# Patient Record
Sex: FEMALE | Race: OTHER | Employment: STUDENT | URBAN - METROPOLITAN AREA
[De-identification: names, ages, dates, MRNs, and addresses within clinical notes are randomized per-mention and may not be internally consistent; named-entity substitution may affect disease eponyms.]

---
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
## 2023-03-27 ENCOUNTER — HOSPITAL ENCOUNTER (OUTPATIENT)
Age: 18
Discharge: HOME | End: 2023-03-27
Payer: MEDICAID

## 2023-03-27 VITALS
HEIGHT: 67 IN | WEIGHT: 200 LBS | DIASTOLIC BLOOD PRESSURE: 48 MMHG | HEART RATE: 100 BPM | RESPIRATION RATE: 18 BRPM | BODY MASS INDEX: 31.39 KG/M2 | SYSTOLIC BLOOD PRESSURE: 126 MMHG | OXYGEN SATURATION: 100 % | TEMPERATURE: 98 F

## 2023-03-27 DIAGNOSIS — N89.8 VAGINAL IRRITATION: Primary | ICD-10-CM

## 2023-03-27 LAB
B-HCG UR QL: NEGATIVE
POCT BILIRUBIN URINE: NEGATIVE
POCT BLOOD URINE: NEGATIVE
POCT GLUCOSE URINE: NEGATIVE MG/DL
POCT KETONE URINE: NEGATIVE MG/DL
POCT LEUKOCYTE ESTERASE URINE: NEGATIVE
POCT NITRITE URINE: NEGATIVE
POCT PH URINE: 6 (ref 5–8)
POCT PROTEIN URINE: NEGATIVE MG/DL
POCT SPECIFIC GRAVITY URINE: 1.01
POCT UROBILINOGEN URINE: 0.2 MG/DL

## 2023-03-27 PROCEDURE — 87491 CHLMYD TRACH DNA AMP PROBE: CPT | Performed by: NURSE PRACTITIONER

## 2023-03-27 PROCEDURE — 81025 URINE PREGNANCY TEST: CPT

## 2023-03-27 PROCEDURE — 87591 N.GONORRHOEAE DNA AMP PROB: CPT | Performed by: NURSE PRACTITIONER

## 2023-03-27 PROCEDURE — 81002 URINALYSIS NONAUTO W/O SCOPE: CPT | Performed by: NURSE PRACTITIONER

## 2023-03-27 PROCEDURE — 87510 GARDNER VAG DNA DIR PROBE: CPT | Performed by: NURSE PRACTITIONER

## 2023-03-27 PROCEDURE — 87480 CANDIDA DNA DIR PROBE: CPT | Performed by: NURSE PRACTITIONER

## 2023-03-27 PROCEDURE — 99214 OFFICE O/P EST MOD 30 MIN: CPT

## 2023-03-27 PROCEDURE — 87660 TRICHOMONAS VAGIN DIR PROBE: CPT | Performed by: NURSE PRACTITIONER

## 2023-03-27 NOTE — ED INITIAL ASSESSMENT (HPI)
Patient states vaginal itching x 5-6 days with some intermittent low abdominal discomfort. Denies any fevers, chills, N/V/D, dysuria, hematuria, or other symptoms.

## 2023-03-28 LAB
C TRACH DNA SPEC QL NAA+PROBE: NEGATIVE
N GONORRHOEA DNA SPEC QL NAA+PROBE: NEGATIVE

## 2023-03-28 RX ORDER — FLUCONAZOLE 150 MG/1
150 TABLET ORAL ONCE
Qty: 2 TABLET | Refills: 0 | Status: SHIPPED | OUTPATIENT
Start: 2023-03-28 | End: 2023-03-28

## 2023-12-21 ENCOUNTER — HOSPITAL ENCOUNTER (EMERGENCY)
Age: 18
Discharge: HOME | End: 2023-12-21
Attending: STUDENT IN AN ORGANIZED HEALTH CARE EDUCATION/TRAINING PROGRAM
Payer: MEDICAID

## 2023-12-21 ENCOUNTER — APPOINTMENT (OUTPATIENT)
Dept: ULTRASOUND IMAGING | Age: 18
End: 2023-12-21
Attending: STUDENT IN AN ORGANIZED HEALTH CARE EDUCATION/TRAINING PROGRAM
Payer: MEDICAID

## 2023-12-21 VITALS
RESPIRATION RATE: 18 BRPM | SYSTOLIC BLOOD PRESSURE: 133 MMHG | WEIGHT: 196 LBS | HEART RATE: 89 BPM | HEIGHT: 67 IN | OXYGEN SATURATION: 100 % | BODY MASS INDEX: 30.76 KG/M2 | TEMPERATURE: 100 F | DIASTOLIC BLOOD PRESSURE: 53 MMHG

## 2023-12-21 DIAGNOSIS — R10.9 ACUTE RIGHT FLANK PAIN: ICD-10-CM

## 2023-12-21 DIAGNOSIS — O21.9 NAUSEA AND VOMITING IN PREGNANCY: ICD-10-CM

## 2023-12-21 DIAGNOSIS — R10.31 RLQ ABDOMINAL PAIN: Primary | ICD-10-CM

## 2023-12-21 LAB
ALBUMIN SERPL-MCNC: 3.2 G/DL (ref 3.4–5)
ALBUMIN/GLOB SERPL: 0.7 {RATIO} (ref 1–2)
ALP LIVER SERPL-CCNC: 74 U/L
ALT SERPL-CCNC: 19 U/L
ANION GAP SERPL CALC-SCNC: 4 MMOL/L (ref 0–18)
AST SERPL-CCNC: 11 U/L (ref 15–37)
B-HCG SERPL-ACNC: 94 MIU/ML
BASOPHILS # BLD AUTO: 0.05 X10(3) UL (ref 0–0.2)
BASOPHILS NFR BLD AUTO: 0.5 %
BILIRUB SERPL-MCNC: 0.2 MG/DL (ref 0.1–2)
BILIRUB UR QL STRIP.AUTO: NEGATIVE
BUN BLD-MCNC: 15 MG/DL (ref 9–23)
CALCIUM BLD-MCNC: 8.6 MG/DL (ref 8.5–10.1)
CHLORIDE SERPL-SCNC: 109 MMOL/L (ref 98–112)
CLARITY UR REFRACT.AUTO: CLEAR
CO2 SERPL-SCNC: 25 MMOL/L (ref 21–32)
COLOR UR AUTO: YELLOW
CREAT BLD-MCNC: 0.66 MG/DL
EGFRCR SERPLBLD CKD-EPI 2021: 129 ML/MIN/1.73M2 (ref 60–?)
EOSINOPHIL # BLD AUTO: 0.17 X10(3) UL (ref 0–0.7)
EOSINOPHIL NFR BLD AUTO: 1.6 %
ERYTHROCYTE [DISTWIDTH] IN BLOOD BY AUTOMATED COUNT: 12.5 %
GLOBULIN PLAS-MCNC: 4.4 G/DL (ref 2.8–4.4)
GLUCOSE BLD-MCNC: 78 MG/DL (ref 70–99)
GLUCOSE UR STRIP.AUTO-MCNC: NEGATIVE MG/DL
HCT VFR BLD AUTO: 37.2 %
HGB BLD-MCNC: 12.3 G/DL
IMM GRANULOCYTES # BLD AUTO: 0.03 X10(3) UL (ref 0–1)
IMM GRANULOCYTES NFR BLD: 0.3 %
KETONES UR STRIP.AUTO-MCNC: NEGATIVE MG/DL
LEUKOCYTE ESTERASE UR QL STRIP.AUTO: NEGATIVE
LIPASE SERPL-CCNC: 46 U/L (ref 13–75)
LYMPHOCYTES # BLD AUTO: 2.52 X10(3) UL (ref 1–4)
LYMPHOCYTES NFR BLD AUTO: 24.2 %
MCH RBC QN AUTO: 29.4 PG (ref 26–34)
MCHC RBC AUTO-ENTMCNC: 33.1 G/DL (ref 31–37)
MCV RBC AUTO: 89 FL
MONOCYTES # BLD AUTO: 0.66 X10(3) UL (ref 0.1–1)
MONOCYTES NFR BLD AUTO: 6.3 %
NEUTROPHILS # BLD AUTO: 6.98 X10 (3) UL (ref 1.5–7.7)
NEUTROPHILS # BLD AUTO: 6.98 X10(3) UL (ref 1.5–7.7)
NEUTROPHILS NFR BLD AUTO: 67.1 %
NITRITE UR QL STRIP.AUTO: NEGATIVE
OSMOLALITY SERPL CALC.SUM OF ELEC: 286 MOSM/KG (ref 275–295)
PH UR STRIP.AUTO: 6 [PH] (ref 5–8)
PLATELET # BLD AUTO: 316 10(3)UL (ref 150–450)
POTASSIUM SERPL-SCNC: 3.8 MMOL/L (ref 3.5–5.1)
PROT SERPL-MCNC: 7.6 G/DL (ref 6.4–8.2)
PROT UR STRIP.AUTO-MCNC: NEGATIVE MG/DL
RBC # BLD AUTO: 4.18 X10(6)UL
RBC UR QL AUTO: NEGATIVE
RH BLOOD TYPE: POSITIVE
SODIUM SERPL-SCNC: 138 MMOL/L (ref 136–145)
SP GR UR STRIP.AUTO: 1.02 (ref 1–1.03)
UROBILINOGEN UR STRIP.AUTO-MCNC: 0.2 MG/DL
WBC # BLD AUTO: 10.4 X10(3) UL (ref 4–11)

## 2023-12-21 PROCEDURE — 80053 COMPREHEN METABOLIC PANEL: CPT | Performed by: STUDENT IN AN ORGANIZED HEALTH CARE EDUCATION/TRAINING PROGRAM

## 2023-12-21 PROCEDURE — 84702 CHORIONIC GONADOTROPIN TEST: CPT | Performed by: STUDENT IN AN ORGANIZED HEALTH CARE EDUCATION/TRAINING PROGRAM

## 2023-12-21 PROCEDURE — 76817 TRANSVAGINAL US OBSTETRIC: CPT | Performed by: STUDENT IN AN ORGANIZED HEALTH CARE EDUCATION/TRAINING PROGRAM

## 2023-12-21 PROCEDURE — 99285 EMERGENCY DEPT VISIT HI MDM: CPT

## 2023-12-21 PROCEDURE — 85025 COMPLETE CBC W/AUTO DIFF WBC: CPT | Performed by: STUDENT IN AN ORGANIZED HEALTH CARE EDUCATION/TRAINING PROGRAM

## 2023-12-21 PROCEDURE — 96360 HYDRATION IV INFUSION INIT: CPT

## 2023-12-21 PROCEDURE — 86900 BLOOD TYPING SEROLOGIC ABO: CPT | Performed by: STUDENT IN AN ORGANIZED HEALTH CARE EDUCATION/TRAINING PROGRAM

## 2023-12-21 PROCEDURE — 76801 OB US < 14 WKS SINGLE FETUS: CPT | Performed by: STUDENT IN AN ORGANIZED HEALTH CARE EDUCATION/TRAINING PROGRAM

## 2023-12-21 PROCEDURE — 86901 BLOOD TYPING SEROLOGIC RH(D): CPT | Performed by: STUDENT IN AN ORGANIZED HEALTH CARE EDUCATION/TRAINING PROGRAM

## 2023-12-21 PROCEDURE — 81003 URINALYSIS AUTO W/O SCOPE: CPT | Performed by: STUDENT IN AN ORGANIZED HEALTH CARE EDUCATION/TRAINING PROGRAM

## 2023-12-21 PROCEDURE — 83690 ASSAY OF LIPASE: CPT | Performed by: STUDENT IN AN ORGANIZED HEALTH CARE EDUCATION/TRAINING PROGRAM

## 2023-12-21 PROCEDURE — 99284 EMERGENCY DEPT VISIT MOD MDM: CPT

## 2023-12-21 NOTE — ED INITIAL ASSESSMENT (HPI)
Right sided abd pain for one week. No nausea no fever no diarrhea pain does not worsen with movement. Took home pregnancy test which was positive.  Lmp 11/29/23

## 2023-12-22 NOTE — ED QUICK NOTES
Pt did not want to stay for results. Informed the pt of the benefits of staying and the risks of leaving. Pt stated she understood the teaching. IV taken out prior to leaving. Pt left the unit at this time. LEORAD made aware of pt leaving unit.

## 2023-12-22 NOTE — DISCHARGE INSTRUCTIONS
Initiate prenatal vitamins. You may take Tylenol as needed for pain. If severe uncontrolled pain or feeling faint were to develop seek care in the nearest emergency department or return here for care. It is my recommendation you follow up with an obstetrician within 1 week. Referral for the on-call OB provided in my chart. For further evaluation of your pregnancy with abdominal discomfort. You should seek emergency medical care if pain were to worsen or new concerns were to develop.

## 2023-12-29 ENCOUNTER — HOSPITAL ENCOUNTER (OUTPATIENT)
Age: 18
Discharge: HOME | End: 2023-12-29
Attending: EMERGENCY MEDICINE
Payer: MEDICAID

## 2023-12-29 VITALS
HEART RATE: 132 BPM | TEMPERATURE: 103 F | DIASTOLIC BLOOD PRESSURE: 41 MMHG | SYSTOLIC BLOOD PRESSURE: 116 MMHG | BODY MASS INDEX: 30.76 KG/M2 | HEIGHT: 67 IN | RESPIRATION RATE: 18 BRPM | OXYGEN SATURATION: 99 % | WEIGHT: 196 LBS

## 2023-12-29 DIAGNOSIS — J11.1 INFLUENZA: ICD-10-CM

## 2023-12-29 DIAGNOSIS — J02.0 STREPTOCOCCAL SORE THROAT: Primary | ICD-10-CM

## 2023-12-29 DIAGNOSIS — Z34.90 PREGNANCY, UNSPECIFIED GESTATIONAL AGE: ICD-10-CM

## 2023-12-29 LAB
POCT INFLUENZA A: POSITIVE
POCT INFLUENZA B: NEGATIVE
S PYO AG THROAT QL IA.RAPID: POSITIVE
SARS-COV-2 RNA RESP QL NAA+PROBE: NOT DETECTED

## 2023-12-29 PROCEDURE — 87651 STREP A DNA AMP PROBE: CPT | Performed by: EMERGENCY MEDICINE

## 2023-12-29 PROCEDURE — 99213 OFFICE O/P EST LOW 20 MIN: CPT

## 2023-12-29 PROCEDURE — 87502 INFLUENZA DNA AMP PROBE: CPT | Performed by: EMERGENCY MEDICINE

## 2023-12-29 PROCEDURE — 99214 OFFICE O/P EST MOD 30 MIN: CPT

## 2023-12-29 RX ORDER — ACETAMINOPHEN 500 MG
1000 TABLET ORAL ONCE
Status: COMPLETED | OUTPATIENT
Start: 2023-12-29 | End: 2023-12-29

## 2023-12-29 RX ORDER — AMOXICILLIN 875 MG/1
875 TABLET, COATED ORAL 2 TIMES DAILY
Qty: 20 TABLET | Refills: 0 | Status: SHIPPED | OUTPATIENT
Start: 2023-12-29 | End: 2024-01-08

## 2023-12-29 NOTE — ED INITIAL ASSESSMENT (HPI)
Body aches, cough, headache, fever x 2-3 days    Pt found out she's pregnant 2 weeks ago and was having abd cramping yesterday, no vag bleeding

## 2023-12-30 ENCOUNTER — HOSPITAL ENCOUNTER (EMERGENCY)
Age: 18
Discharge: HOME | End: 2023-12-30
Attending: EMERGENCY MEDICINE
Payer: MEDICAID

## 2023-12-30 ENCOUNTER — APPOINTMENT (OUTPATIENT)
Dept: ULTRASOUND IMAGING | Age: 18
End: 2023-12-30
Payer: MEDICAID

## 2023-12-30 VITALS
TEMPERATURE: 99 F | RESPIRATION RATE: 18 BRPM | WEIGHT: 196 LBS | HEART RATE: 107 BPM | BODY MASS INDEX: 31.5 KG/M2 | SYSTOLIC BLOOD PRESSURE: 106 MMHG | DIASTOLIC BLOOD PRESSURE: 67 MMHG | HEIGHT: 66 IN | OXYGEN SATURATION: 99 %

## 2023-12-30 DIAGNOSIS — O20.0 THREATENED MISCARRIAGE: Primary | ICD-10-CM

## 2023-12-30 LAB
B-HCG SERPL-ACNC: 153 MIU/ML
B-HCG UR QL: POSITIVE
BASOPHILS # BLD AUTO: 0.02 X10(3) UL (ref 0–0.2)
BASOPHILS NFR BLD AUTO: 0.4 %
BILIRUB UR QL STRIP.AUTO: NEGATIVE
CLARITY UR REFRACT.AUTO: CLEAR
COLOR UR AUTO: YELLOW
EOSINOPHIL # BLD AUTO: 0.04 X10(3) UL (ref 0–0.7)
EOSINOPHIL NFR BLD AUTO: 0.8 %
ERYTHROCYTE [DISTWIDTH] IN BLOOD BY AUTOMATED COUNT: 12.9 %
GLUCOSE UR STRIP.AUTO-MCNC: NEGATIVE MG/DL
HCT VFR BLD AUTO: 40.6 %
HGB BLD-MCNC: 13.4 G/DL
IMM GRANULOCYTES # BLD AUTO: 0.01 X10(3) UL (ref 0–1)
IMM GRANULOCYTES NFR BLD: 0.2 %
KETONES UR STRIP.AUTO-MCNC: NEGATIVE MG/DL
LEUKOCYTE ESTERASE UR QL STRIP.AUTO: NEGATIVE
LYMPHOCYTES # BLD AUTO: 0.84 X10(3) UL (ref 1–4)
LYMPHOCYTES NFR BLD AUTO: 17.6 %
MCH RBC QN AUTO: 29.1 PG (ref 26–34)
MCHC RBC AUTO-ENTMCNC: 33 G/DL (ref 31–37)
MCV RBC AUTO: 88.1 FL
MONOCYTES # BLD AUTO: 0.38 X10(3) UL (ref 0.1–1)
MONOCYTES NFR BLD AUTO: 8 %
NEUTROPHILS # BLD AUTO: 3.48 X10 (3) UL (ref 1.5–7.7)
NEUTROPHILS # BLD AUTO: 3.48 X10(3) UL (ref 1.5–7.7)
NEUTROPHILS NFR BLD AUTO: 73 %
NITRITE UR QL STRIP.AUTO: NEGATIVE
PH UR STRIP.AUTO: 5.5 [PH] (ref 5–8)
PLATELET # BLD AUTO: 264 10(3)UL (ref 150–450)
PROT UR STRIP.AUTO-MCNC: NEGATIVE MG/DL
RBC # BLD AUTO: 4.61 X10(6)UL
RBC #/AREA URNS AUTO: >10 /HPF
RH BLOOD TYPE: POSITIVE
SP GR UR STRIP.AUTO: <=1.005 (ref 1–1.03)
UROBILINOGEN UR STRIP.AUTO-MCNC: 0.2 MG/DL
WBC # BLD AUTO: 4.8 X10(3) UL (ref 4–11)

## 2023-12-30 PROCEDURE — 81001 URINALYSIS AUTO W/SCOPE: CPT | Performed by: EMERGENCY MEDICINE

## 2023-12-30 PROCEDURE — 36415 COLL VENOUS BLD VENIPUNCTURE: CPT

## 2023-12-30 PROCEDURE — 81025 URINE PREGNANCY TEST: CPT

## 2023-12-30 PROCEDURE — 81001 URINALYSIS AUTO W/SCOPE: CPT

## 2023-12-30 PROCEDURE — 76801 OB US < 14 WKS SINGLE FETUS: CPT | Performed by: EMERGENCY MEDICINE

## 2023-12-30 PROCEDURE — 76817 TRANSVAGINAL US OBSTETRIC: CPT | Performed by: EMERGENCY MEDICINE

## 2023-12-30 PROCEDURE — 86901 BLOOD TYPING SEROLOGIC RH(D): CPT

## 2023-12-30 PROCEDURE — 84702 CHORIONIC GONADOTROPIN TEST: CPT | Performed by: EMERGENCY MEDICINE

## 2023-12-30 PROCEDURE — 86900 BLOOD TYPING SEROLOGIC ABO: CPT

## 2023-12-30 PROCEDURE — 84702 CHORIONIC GONADOTROPIN TEST: CPT

## 2023-12-30 PROCEDURE — 99285 EMERGENCY DEPT VISIT HI MDM: CPT

## 2023-12-30 PROCEDURE — 85025 COMPLETE CBC W/AUTO DIFF WBC: CPT

## 2023-12-30 PROCEDURE — 81015 MICROSCOPIC EXAM OF URINE: CPT

## 2023-12-30 PROCEDURE — 85025 COMPLETE CBC W/AUTO DIFF WBC: CPT | Performed by: EMERGENCY MEDICINE

## 2023-12-30 PROCEDURE — 86901 BLOOD TYPING SEROLOGIC RH(D): CPT | Performed by: EMERGENCY MEDICINE

## 2023-12-30 PROCEDURE — 86900 BLOOD TYPING SEROLOGIC ABO: CPT | Performed by: EMERGENCY MEDICINE

## 2023-12-30 PROCEDURE — 99284 EMERGENCY DEPT VISIT MOD MDM: CPT

## 2023-12-30 NOTE — ED INITIAL ASSESSMENT (HPI)
States had positive pregnancy test at home, states today with abdominal cramping and bleeding, seen here last week for same tested for positive for strep yesterday

## 2024-09-01 ENCOUNTER — APPOINTMENT (OUTPATIENT)
Dept: CT IMAGING | Age: 19
End: 2024-09-01
Attending: EMERGENCY MEDICINE
Payer: MEDICAID

## 2024-09-01 ENCOUNTER — HOSPITAL ENCOUNTER (EMERGENCY)
Age: 19
Discharge: HOME | End: 2024-09-01
Attending: EMERGENCY MEDICINE
Payer: MEDICAID

## 2024-09-01 VITALS
OXYGEN SATURATION: 99 % | RESPIRATION RATE: 16 BRPM | HEIGHT: 67 IN | TEMPERATURE: 98 F | WEIGHT: 200 LBS | SYSTOLIC BLOOD PRESSURE: 124 MMHG | BODY MASS INDEX: 31.39 KG/M2 | HEART RATE: 78 BPM | DIASTOLIC BLOOD PRESSURE: 66 MMHG

## 2024-09-01 DIAGNOSIS — R10.31 ABDOMINAL PAIN, RIGHT LOWER QUADRANT: Primary | ICD-10-CM

## 2024-09-01 LAB — B-HCG UR QL: NEGATIVE

## 2024-09-01 PROCEDURE — 81025 URINE PREGNANCY TEST: CPT

## 2024-09-01 PROCEDURE — 99284 EMERGENCY DEPT VISIT MOD MDM: CPT

## 2024-09-01 PROCEDURE — 74176 CT ABD & PELVIS W/O CONTRAST: CPT | Performed by: EMERGENCY MEDICINE

## 2024-09-01 NOTE — ED INITIAL ASSESSMENT (HPI)
Patient to ER with c/o right side flank pain that radiates to lower right abdomen for the past 9 days. Patient denies any injury. History of prior kidney stone. No pain medications taken PTA. Denies any urinary symptoms. Patient denies any N/V/D.

## 2024-09-01 NOTE — ED PROVIDER NOTES
Patient Seen in: [facility] In [place]      History     Chief Complaint   Patient presents with    Back Pain    Abdomen/Flank Pain     Stated Complaint: Low back pain radiating to low right abdomen pain - denies NVD    Subjective:   HPI    18-year-old female presents to the emergency department complaint of having low back pain that radiates to her right low abdomen.  The patient says the symptoms been present for the past 9 days.  She has a history of kidney stones but denies any flank discomfort at this time.  No nausea or vomiting.  No loose stools.  She thought initially that this may have been the start of her menstrual cycle.  She has not had her menses at this time and states that her menses is not late.  She denies any fevers or chills.  Reviewing her record she was seen on 12/30/2023 for threatened miscarriage.  The patient points to pain primarily emanating from her right groin area.    Objective:   Past Medical History:    Kidney stone    Ureterocele              Past Surgical History:   Procedure Laterality Date    Lithotripsy                  Social History     Socioeconomic History    Marital status: Single   Tobacco Use    Smoking status: Never    Smokeless tobacco: Never   Vaping Use    Vaping status: Never Used   Substance and Sexual Activity    Alcohol use: Yes     Comment: occ    Drug use: Never     Social Determinants of Health     Financial Resource Strain: Not at Risk (4/4/2024)    Received from [employer]    Financial Resource Strain     Financial Resource Strain: 1   Food Insecurity: Not at Risk (4/4/2024)    Received from [employer]    Food Insecurity     Food: 1   Transportation Needs: Not at Risk (4/4/2024)    Received from [employer]    Transportation Needs     Transportation: 1   Physical Activity: Not on File (4/3/2024)    Received from [employer]    Physical Activity     Physical Activity: 0   Stress: Not on File (4/3/2024)    Received from [employer]    Stress     Stress: 0   Social  Connections: Not on File (4/3/2024)    Received from [employer]     Social Connections and Isolation: 0   Housing Stability: Not at Risk (2024)    Received from [facility]    Housing Stability     Housin              Review of Systems    Positive for stated Chief Complaint: Back Pain and Abdomen/Flank Pain    Other systems are as noted in HPI.  Constitutional and vital signs reviewed.      All other systems reviewed and negative except as noted above.    Physical Exam     ED Triage Vitals [24 0628]   /68   Pulse 88   Resp 16   Temp 98.4 °F (36.9 °C)   Temp src Oral   SpO2 100 %   O2 Device None (Room air)       Current Vitals:   Vital Signs  BP: 136/68  Pulse: 88  Resp: 16  Temp: 98.4 °F (36.9 °C)  Temp src: Oral    Oxygen Therapy  SpO2: 100 %  O2 Device: None (Room air)            Physical Exam  General: This a pleasant nontoxic appearing patient in no apparent distress alert and oriented ×3  HEENT: Pupils are equal reactive to light.  Extra ocular motions are intact.  No scleral icterus or conjunctival pallor.  Lungs: Clear to auscultation bilaterally.  No wheezes, rhonchi, or rales appreciated.  No accessory muscle use noted for breathing.  Cardiac: Regular rate and rhythm.  Normal S1 and 2 without murmurs or ectopy appreciated  Abdomen: Soft on examination without tenderness to deep palpation or to percussion.  No masses appreciated.  Bowel sounds are normoactive.  No CVA tenderness.  Extremities: No cyanosis, no edema or clubbing.  Pulses are +2.  Full range of motion is noted of the extremities without deformities.  No tenderness.  Neurologically intact.       ED Course     Labs Reviewed   POCT PREGNANCY URINE - Normal     Narrative  PROCEDURE:  CT ABDOMEN+PELVIS KIDNEYSTONE 2D RNDR(NO IV,NO ORAL)(CPT=74176)     COMPARISON:  [facility], CT, CT ANGIOGRAPHY, CHEST (CPT=71275), 10/22/2020, 12:06 PM.     INDICATIONS:  Low back pain radiating to the right lower quadrant of the  abdomen.- denies NVD hx of kidney stones     TECHNIQUE:  Unenhanced multislice CT scanning from above the kidneys to below the urinary bladder.  2D rendering are generated on the CT scanner workstation to localize potential stones in the cranio-caudal plane.  Dose reduction techniques were used.  Dose information is transmitted to the ACR (American College of Radiology) NRDR (National Radiology Data Registry) which includes the Dose Index Registry.     PATIENT STATED HISTORY: (As transcribed by Technologist)  Patient complains of right lower flank pain.         FINDINGS:    KIDNEYS:  Tiny non-obstructing calculus suggested within a right inferior pole calyx measuring no more than 2 mm.  Similar, 2 mm calculus suggested within a left superior pole calyx.  No evidence of hydronephrosis or hydroureter.  No perinephric fluid or   inflammatory changes.  BLADDER:  No mass, calculus or significant wall thickening.  ADRENALS:  No mass or enlargement.    LIVER:  There is a nonspecific low-density focus within the posterior segment of the right lobe peripherally measuring 2.1 x 2.2 cm.  BILIARY:  No visible dilatation or calcification.    PANCREAS:  No lesion, fluid collection, ductal dilatation, or atrophy.    SPLEEN:  No enlargement or focal lesion.    AORTA/VASCULAR:  No aneurysm.    RETROPERITONEUM:  No mass or adenopathy.    BOWEL/MESENTERY:  No visible mass, obstruction, or bowel wall thickening.  Normal appendix.  No free intraperitoneal air, fluid or inflammatory changes of the peritoneal cavity identified.  ABDOMINAL WALL:  No mass or hernia.    BONES:  No bony lesion or fracture.  PELVIC ORGANS:  Normal for age.    LUNG BASES:  No visible pulmonary or pleural disease.    OTHER:  Negative.                    Impression  CONCLUSION:    1. No acute process identified.  2. Punctate nonobstructing bilateral renal stones are suspected.  No hydronephrosis or hydroureter.  3. Normal appearance of the appendix.  4.  Low-density focus along the posterior segment right lobe of the liver measuring 2.2 cm.  This is nonspecific finding could represent an underlying hemangioma.  Of note prior CT scan of the chest which included a large portion of the liver suggested  the presence of multiple regions of FNH.  If clinically indicated, this may be further evaluated with ultrasound imaging or if necessary, contrast enhanced MRI.           LOCATION:  [facility]        Dictated by (CST): Dwight Man DO on 9/01/2024 at 7:47 AM      Finalized by (CST): Dwight Man DO on 9/01/2024 at 7:54 AM                   MDM    Differential diagnosis includes is not limited to kidney stone, ectopic pregnancy, acute appendicitis, bowel perforation, musculoskeletal pain.  Urine pregnancy was performed.  CT scan abdomen pelvis will be performed as long as her urine pregnancy is negative.  I reviewed the x-rays and agree with the radiologist report that showed no acute process identified.  Punctate nonobstructing bilateral renal stones are suspected.  No hydronephrosis or hydroureter.  Normal appearance of the appendix.  Low-density focus along the posterior segment right lobe of the liver measuring 2.2 cm.  This nonspecific finding could represent an underlying hemangioma.  Of note prior CT scan of the chest which included a large portion of the liver suggesting the presence of multiple regions of FNH.  If clinically indicated this may be further evaluated with ultrasound imaging or if necessary contrast-enhanced MRI.  The patient will be discharged.  There is no specific etiology to account for her discomfort at this time.  Take ibuprofen or Tylenol for discomfort control.  Return if symptoms progress or worsen.  Note to Patient  The 21st Century Cures Act makes medical notes like these available to patients in the interest of transparency. However, be advised this is a medical document and is intended as [id number]-rt-tzay communication; it is written in  medical language and may appear blunt, direct, or contain abbreviations or verbiage that are unfamiliar. Medical documents are intended to carry relevant information, facts as evident, and the clinical opinion of the practitioner.  Patient was evaluated and a screening exam was performed.   As a treating physician attending to the patient, I determined, within reasonable clinical confidence and prior to discharge, that an emergency medical condition was not or was no longer present.  There was no indication for further evaluation, treatment or admission on an emergency basis.  Comprehensive verbal and written discharge and follow-up instructions were provided to help prevent relapse or worsening.  Patient was instructed to follow-up with their primary care provider for further evaluation and treatment, but to return immediately to the ER for worsening, concerning, new, changing or persisting symptoms.  I discussed the case with the patient and they had no questions, complaints, or concerns.  Patient felt comfortable going home.  ^^Please note that this report has been produced using speech recognition software and may contain errors related to that system including, but not limited to, errors in grammar, punctuation, and spelling, as well as words and phrases that possibly may have been recognized inappropriately.  If there are any questions or concerns, contact the dictating provider for clarification.                         Medical Decision Making      Disposition and Plan     Clinical Impression:  1. Abdominal pain, right lower quadrant         Disposition:  Discharge  9/1/2024  8:44 am    Follow-up:  Your primary care physician    Schedule an appointment as soon as possible for a visit in 1 week(s)            Medications Prescribed:  There are no discharge medications for this patient.

## 2025-05-03 ENCOUNTER — HOSPITAL ENCOUNTER (OUTPATIENT)
Age: 20
Discharge: HOME | End: 2025-05-03
Payer: COMMERCIAL

## 2025-05-03 VITALS
TEMPERATURE: 101 F | OXYGEN SATURATION: 97 % | HEART RATE: 121 BPM | RESPIRATION RATE: 20 BRPM | SYSTOLIC BLOOD PRESSURE: 118 MMHG | DIASTOLIC BLOOD PRESSURE: 60 MMHG

## 2025-05-03 DIAGNOSIS — J02.0 STREPTOCOCCAL SORE THROAT: ICD-10-CM

## 2025-05-03 DIAGNOSIS — U07.1 COVID-19: Primary | ICD-10-CM

## 2025-05-03 LAB
POCT INFLUENZA A: NEGATIVE
POCT INFLUENZA B: NEGATIVE
S PYO AG THROAT QL IA.RAPID: POSITIVE
SARS-COV-2 RNA RESP QL NAA+PROBE: DETECTED

## 2025-05-03 PROCEDURE — 99213 OFFICE O/P EST LOW 20 MIN: CPT

## 2025-05-03 PROCEDURE — 87502 INFLUENZA DNA AMP PROBE: CPT | Performed by: NURSE PRACTITIONER

## 2025-05-03 PROCEDURE — 87651 STREP A DNA AMP PROBE: CPT | Performed by: NURSE PRACTITIONER

## 2025-05-03 RX ORDER — PENICILLIN V POTASSIUM 500 MG/1
500 TABLET, FILM COATED ORAL 2 TIMES DAILY
Qty: 20 TABLET | Refills: 0 | Status: SHIPPED | OUTPATIENT
Start: 2025-05-03 | End: 2025-05-13

## 2025-05-03 RX ORDER — FLUCONAZOLE 200 MG/1
200 TABLET ORAL
Qty: 2 TABLET | Refills: 0 | Status: SHIPPED | OUTPATIENT
Start: 2025-05-03 | End: 2025-05-07

## 2025-05-03 NOTE — ED PROVIDER NOTES
Patient Seen in: [facility] [place]      History   No chief complaint on file.    Stated Complaint: Sore Throat    Subjective:   Pleasant 19-year-old female presents immediate care for fever body aches and sore throat.  Patient symptoms started 3 days ago.  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           Objective:     Past Medical History:    Kidney stone    Ureterocele              Past Surgical History:   Procedure Laterality Date    Lithotripsy                  Social History     Socioeconomic History    Marital status: Single   Tobacco Use    Smoking status: Never    Smokeless tobacco: Never   Vaping Use    Vaping status: Never Used   Substance and Sexual Activity    Alcohol use: Yes     Comment: occ    Drug use: Never     Social Drivers of Health     Food Insecurity: Not at Risk (2024)    Received from [employer]    Food Insecurity     Food: 1   Transportation Needs: Not at Risk (2024)    Received from [employer]    Transportation Needs     Transportation: 1   Housing Stability: Not at Risk (2024)    Received from [employer]    Housing Stability     Housin              Review of Systems   Constitutional:  Positive for fatigue and fever.   HENT:  Positive for sore throat.    Respiratory: Negative.     Cardiovascular: Negative.    Gastrointestinal: Negative.    Skin: Negative.    Neurological: Negative.        Positive for stated complaint: Sore Throat  Other systems are as noted in HPI.  Constitutional and vital signs reviewed.      All other systems reviewed and negative except as noted above.                  Physical Exam     ED Triage Vitals [25 0804]   /60   Pulse (!) 121   Resp 20   Temp (!) 100.9 °F (38.3 °C)   Temp src Oral   SpO2 97 %   O2 Device None (Room air)       Current Vitals:   Vital Signs  BP: 118/60  Pulse: (!) 121  Resp: 20  Temp: (!) 100.9 °F (38.3 °C)  Temp src: Oral    Oxygen Therapy  SpO2: 97 %  O2 Device: None (Room air)        Physical  Exam  Vitals and nursing note reviewed.   Constitutional:       General: She is not in acute distress.  HENT:      Head: Normocephalic.      Mouth/Throat:      Pharynx: Posterior oropharyngeal erythema present.      Tonsils: Tonsillar exudate present. 1+ on the right. 1+ on the left.   Cardiovascular:      Rate and Rhythm: Normal rate.   Pulmonary:      Effort: Pulmonary effort is normal.   Musculoskeletal:         General: Normal range of motion.   Skin:     General: Skin is warm and dry.   Neurological:      General: No focal deficit present.      Mental Status: She is alert and oriented to person, place, and time.       ED Course     Labs Reviewed   RAPID STREP A - Abnormal; Notable for the following components:       Result Value    Strep A by PCR Positive (*)     All other components within normal limits   RAPID SARS-COV-2 BY PCR - Abnormal; Notable for the following components:    Rapid SARS-CoV-2 by PCR Detected (*)     All other components within normal limits   POCT FLU TEST - Normal    Narrative:     This assay is a rapid molecular in vitro test utilizing nucleic acid amplification of influenza A and B viral RNA.          MDM          Medical Decision Making  Pertinent Labs & Imaging studies reviewed. (See chart for details).  Patient coming in with sore throat, fatigue, body.   Differential diagnosis includes COVID, flu, strep     Patient is comfortable with plan of care.     Overall Pt looks good. Non-toxic, well-hydrated and in no respiratory distress. Vital signs are reassuring. Exam is reassuring. I do not believe pt requires and additional diagnostic studies or intervention. I believe pt can be discharged home to continue evaluation as an outpatient. Follow-up provider given. Discharge instructions given and reviewed. Return for any problems. All understand and agrees with the plan.        Problems Addressed:  COVID-19: acute illness or injury  Streptococcal sore throat: acute illness or  injury    Amount and/or Complexity of Data Reviewed  Labs: ordered. Decision-making details documented in ED Course.    Risk  OTC drugs.  Prescription drug management.        Disposition and Plan     Clinical Impression:  1. COVID-19    2. Streptococcal sore throat         Disposition:  Discharge  5/3/2025  8:31 am    Follow-up:  No follow-up provider specified.        Medications Prescribed:  Discharge Medication List as of 5/3/2025  8:32 AM        START taking these medications    Details   penicillin v potassium 500 MG Oral Tab Take 1 tablet (500 mg total) by mouth in the morning and 1 tablet (500 mg total) before bedtime. Do all this for 10 days., Normal, Disp-20 tablet, R-0      fluconazole 200 MG Oral Tab Take 1 tablet (200 mg total) by mouth every third day for 2 doses., Normal, Disp-2 tablet, R-0             Supplementary Documentation:

## 2025-07-02 ENCOUNTER — HOSPITAL ENCOUNTER (OUTPATIENT)
Age: 20
Discharge: HOME | End: 2025-07-02
Payer: COMMERCIAL

## 2025-07-02 VITALS
BODY MASS INDEX: 33.43 KG/M2 | HEIGHT: 67 IN | WEIGHT: 213 LBS | TEMPERATURE: 98 F | DIASTOLIC BLOOD PRESSURE: 75 MMHG | OXYGEN SATURATION: 99 % | RESPIRATION RATE: 22 BRPM | SYSTOLIC BLOOD PRESSURE: 131 MMHG | HEART RATE: 92 BPM

## 2025-07-02 DIAGNOSIS — J02.9 THROAT SORENESS: Primary | ICD-10-CM

## 2025-07-02 DIAGNOSIS — Z20.2 POSSIBLE EXPOSURE TO STD: ICD-10-CM

## 2025-07-02 LAB — S PYO AG THROAT QL IA.RAPID: NEGATIVE

## 2025-07-02 PROCEDURE — 99213 OFFICE O/P EST LOW 20 MIN: CPT

## 2025-07-02 PROCEDURE — 87491 CHLMYD TRACH DNA AMP PROBE: CPT | Performed by: PHYSICIAN ASSISTANT

## 2025-07-02 PROCEDURE — 99214 OFFICE O/P EST MOD 30 MIN: CPT

## 2025-07-02 PROCEDURE — 87591 N.GONORRHOEAE DNA AMP PROB: CPT | Performed by: PHYSICIAN ASSISTANT

## 2025-07-02 PROCEDURE — 87651 STREP A DNA AMP PROBE: CPT | Performed by: PHYSICIAN ASSISTANT

## 2025-07-02 NOTE — ED INITIAL ASSESSMENT (HPI)
Patient reports a sore throat for 1 week.  Patient reports she had strep last month and was treated with an antibiotic.

## 2025-07-02 NOTE — DISCHARGE INSTRUCTIONS
Please return to the ER/clinic if symptoms worsen. Follow-up with your PCP in 24-48 hours as needed.    Recommend taking an over the counter antihistamine daily: IE zyrtec/claritin.  Sleep more upright. Use chloraseptic spray for comfort.  Push fluids and gargle with warm saline rinses.   Do not engage in any sexual activity until labs are in.  Recommend follow-up with your primary care physician or the [facility] department for further evaluation and treatment

## 2025-07-02 NOTE — ED PROVIDER NOTES
Patient Seen in: [facility]        History  Chief Complaint   Patient presents with    Sore Throat     Stated Complaint: sore throat    Subjective:   HPI          19-year-old female here with complaint of sore throat for the past week.  Patient states that she is concerned that she may have an STI of the throat.  Patient was treated for strep a month ago.  Patient denies chest pain, shortness of breath, cough, abdominal pain, nausea, vomiting or diarrhea.  Patient is tolerating p.o. speaking full sentences.  Afebrile        Objective:     Past Medical History:    Kidney stone    Ureterocele              Past Surgical History:   Procedure Laterality Date    Lithotripsy                The patient's medication list, past medical history and social history elements  as listed in today's nurse's notes are reviewed and agree.   The patient's family history is reviewed and is noncontributory to the presenting problem, except as indicated as above.     Social History     Socioeconomic History    Marital status: Single   Tobacco Use    Smoking status: Never    Smokeless tobacco: Never   Vaping Use    Vaping status: Never Used   Substance and Sexual Activity    Alcohol use: Yes     Comment: occ    Drug use: Never     Social Drivers of Health     Food Insecurity: Not at Risk (2024)    Received from [facility]    Food Insecurity     Food: 1   Transportation Needs: Not at Risk (2024)    Received from [facility]    Transportation Needs     Transportation: 1   Housing Stability: Not at Risk (2024)    Received from [facility]    Housing Stability     Housin              Review of Systems    Positive for stated complaint: sore throat  Other systems are as noted in HPI.  Constitutional and vital signs reviewed.      All other systems reviewed and negative except as noted above.                  Physical Exam    ED Triage Vitals [25 0805]   /75   Pulse 92   Resp 22   Temp 98.4 °F (36.9 °C)   Temp src  Oral   SpO2 99 %   O2 Device None (Room air)       Current Vitals:   Vital Signs  BP: 131/75  Pulse: 92  Resp: 22  Temp: 98.4 °F (36.9 °C)  Temp src: Oral    Oxygen Therapy  SpO2: 99 %  O2 Device: None (Room air)            Physical Exam  Vitals and nursing note reviewed.   Constitutional:       Appearance: She is well-developed.   HENT:      Head: Normocephalic.      Jaw: There is normal jaw occlusion.      Right Ear: External ear normal.      Left Ear: External ear normal.      Nose: Nose normal.      Mouth/Throat:      Lips: Pink.      Mouth: Mucous membranes are moist.      Pharynx: Posterior oropharyngeal erythema and postnasal drip present.   Eyes:      Conjunctiva/sclera: Conjunctivae normal.      Pupils: Pupils are equal, round, and reactive to light.   Cardiovascular:      Rate and Rhythm: Normal rate and regular rhythm.      Heart sounds: Normal heart sounds.   Pulmonary:      Effort: Pulmonary effort is normal.      Breath sounds: Normal breath sounds.   Musculoskeletal:      Cervical back: Normal range of motion and neck supple.   Skin:     General: Skin is warm.      Capillary Refill: Capillary refill takes less than 2 seconds.   Neurological:      Mental Status: She is alert and oriented to person, place, and time.   Psychiatric:         Mood and Affect: Mood normal.         Behavior: Behavior normal.         Thought Content: Thought content normal.         Judgment: Judgment normal.               ED Course  Labs Reviewed   RAPID STREP A - Normal   CHLAMYDIA/GONOCOCCUS, PHARY        NOTE: Microbiology was contacted we were informed to use an Aptima swab with the code lab 5550                    [facility]    Clinical Impression: throat soreness/possible STD exposure  Course of Treatment:   Recommend taking an over the counter antihistamine daily: IE zyrtec/claritin.  Sleep more upright. Use chloraseptic spray for comfort.  Push fluids and gargle with warm saline rinses.   Do not engage in any sexual activity  until labs are in.  Recommend follow-up with your primary care physician or the [facility] department for further evaluation and treatment      The patient is encouraged to return if any concerning symptoms arise. Additional verbal discharge instructions are given and discussed. Discharge medications are discussed. The patient is in good condition throughout the visit today and remains so upon discharge. I discuss the plan of care with the patient, who expresses understanding. All questions and concerns are addressed to the patient's satisfaction prior to discharge today.  Previous conversations with PCP and charts were reviewed.              Disposition and Plan     Clinical Impression:  1. Throat soreness    2. Possible exposure to STD         Disposition:  Discharge  7/2/2025  8:43 am    Follow-up:  Kala Gonzalez MD  [address] [id number] [phone]                Medications Prescribed:  Current Discharge Medication List                Supplementary Documentation:

## 2025-07-03 LAB
C. TRACHOMATIS, NAA, PHARYN: NEGATIVE
N. GONORRHOEAE, NAA, PHARYN: NEGATIVE

## 2025-07-14 ENCOUNTER — OFFICE VISIT (OUTPATIENT)
Dept: FAMILY MEDICINE CLINIC | Facility: CLINIC | Age: 20
End: 2025-07-14
Payer: COMMERCIAL

## 2025-07-14 VITALS
WEIGHT: 212 LBS | HEIGHT: 67 IN | TEMPERATURE: 98 F | SYSTOLIC BLOOD PRESSURE: 128 MMHG | HEART RATE: 89 BPM | BODY MASS INDEX: 33.27 KG/M2 | OXYGEN SATURATION: 98 % | RESPIRATION RATE: 21 BRPM | DIASTOLIC BLOOD PRESSURE: 76 MMHG

## 2025-07-14 DIAGNOSIS — E66.811 CLASS 1 OBESITY DUE TO EXCESS CALORIES WITHOUT SERIOUS COMORBIDITY WITH BODY MASS INDEX (BMI) OF 33.0 TO 33.9 IN ADULT: ICD-10-CM

## 2025-07-14 DIAGNOSIS — Z00.00 WELLNESS EXAMINATION: Primary | ICD-10-CM

## 2025-07-14 DIAGNOSIS — F41.9 ANXIETY: ICD-10-CM

## 2025-07-14 DIAGNOSIS — Z00.00 LABORATORY EXAM ORDERED AS PART OF ROUTINE GENERAL MEDICAL EXAMINATION: ICD-10-CM

## 2025-07-14 DIAGNOSIS — Z11.3 SCREENING FOR STD (SEXUALLY TRANSMITTED DISEASE): ICD-10-CM

## 2025-07-14 DIAGNOSIS — E78.00 ELEVATED CHOLESTEROL: ICD-10-CM

## 2025-07-14 DIAGNOSIS — Z23 NEED FOR TDAP VACCINATION: ICD-10-CM

## 2025-07-14 DIAGNOSIS — E66.09 CLASS 1 OBESITY DUE TO EXCESS CALORIES WITHOUT SERIOUS COMORBIDITY WITH BODY MASS INDEX (BMI) OF 33.0 TO 33.9 IN ADULT: ICD-10-CM

## 2025-07-14 DIAGNOSIS — J02.9 SORE THROAT: ICD-10-CM

## 2025-07-14 DIAGNOSIS — Z87.442 HISTORY OF KIDNEY STONES: ICD-10-CM

## 2025-07-14 DIAGNOSIS — L65.9 HAIR LOSS: ICD-10-CM

## 2025-07-14 DIAGNOSIS — Z83.49 FAMILY HISTORY OF THYROID DISEASE: ICD-10-CM

## 2025-07-14 DIAGNOSIS — Z13.1 SCREENING FOR DIABETES MELLITUS: ICD-10-CM

## 2025-07-14 PROBLEM — N20.1 URETER, CALCULUS: Status: RESOLVED | Noted: 2018-08-08 | Resolved: 2025-07-14

## 2025-07-14 NOTE — PATIENT INSTRUCTIONS
Go to the [facility] lab for your fasting blood tests. Do not eat or drink except for water for at least 8 hours prior to the blood tests. Do not take any vitamins or Biotin for 3 days before your blood tests.    You will be contacted by a staff member from [facility] who will assist you in getting established with a therapist for your anxiety.    On exam today, your throat shows a lot of postnasal drip.  You may use over-the-counter antihistamine such as Allegra, Claritin or Zyrtec to see if this helps with your sore throat.    You received the Tadp (tetanus, diptheria, pertussis-whooping cough) vaccine today. You may run a low grade fever, have mild redness or swelling at the site of the shot, muscle pain at the site of the shot for the next 2-3 days. You may take Tylenol or Ibuprofen as needed. Use your arm to help decrease pain and swelling. You can apply ice to any swelling for 10-15 minutes twice daily through clothing or a towel.    For premenopausal women and men, 1000 mg of calcium daily is recommended. For postmenopausal women, 1200 mg of calcium daily is recommended.    To help the body absorb and use calcium, vitamin D 2000 international units daily is recommended.    Recommendations for exercise are 3-5 times weekly for 30-60 minutes for a minimum of 150-300 minutes.     I will contact you with your test results once available.    Patient Resources:     Personal Training/Fitness Classes/Health Coaching     [facility] in [place]: Full fitness center with group fitness and personal training located in [place].  Health Coaching with Tasia Dumas, Aiden Guo, and Lui Mccrudy at our [facility]- individual coaching to work on your health goals. Call [phone] and/or email @ [email or web address]. Free 60 minute consult when client of [facility].  [address] [place] [address] [email or web address].[address] [place] [address].[facility]. A variety of group fitness options plus various yoga  classes [phone] and/or email Mackenzie at [email or web address]  [facility] Fitness Centers with multiple locations: CosNet (www.Gateshop.TriviaPad), F45 Training (www.j58zwuhawbg.TriviaPad), Fit Body Bootcamp (www.Vicampobodybootcamp.TriviaPad), Aunalytics (www.USA Technologies.TriviaPad), The Exercise  (www.exercisecoach.com), Club PilCredport (www.clubXumii.TriviaPad)     Online Fitness  Fitness  on Flyby Media  Fit in 10 DVD series                              www.cnnop05DXIOnTheRoad  Chair exercises via Sit and Be Fit (www.sitandIndy Audio Labs.org) and [employer] (www.Hiptype) or Thierry Fuentes or Pepito Vásquez videos on Flatiron Appsube.  Hip Hop Fit with Misha Wilkinson at www.hiphopfit.Jobber     Apps for on the Go Fitness  [place] 7 Minute Workout (orange box with white 7) - free on the go HIIT training le  Peloton Le @ www.onepeloton.com     Nutrition Trackers and Programs  LoseIT! And Deal.com.sg Fitness Pal apps and on line for tracking nutrition  NOOM - virtual health coaching  FitFoundation (healthy meals on the go) in [place] @ www.xpfnifnlepxlb8cOnTheRoad  Rick MANNING @ [employer] and Hdmemu03 (calorie smart and low carb plans recommended) @ www.qyabnh08.com, Metabolic Meals @ www.Deal.com.sgMetabolicMeals.com - individual prepared meals to go  Gobble, Blue Apron, Home , Every Plate, Sunbasket- on line meal delivery programs for preparation at home  Meal Village in [place] for homemade meals to go @ www.mealvillage.TriviaPad  Diet Doctor @ www.dietdoctor.com - low carb swaps  Yummly - meal prep and planning le (www.yummly.com)     Stress, Anxiety, Depression, Trauma  CALM meditation le (www.calm.com)  Headspace  Don't let anxiety run your life. Using the science of emotion regulation and mindfulness to overcome fear and worry by Jose De Jesus Colon PsyD and Alonzo Ruiz MA.  The [place] From Home.com Entertainment Podcast (September 27, 2023): 6 Magic Words That Stop Anxiety  What Happened to You?- a look at the impact trauma has on behavior  written by Kerry Mtz and Dr. Grover Fletcher  Whole Again by Cuauhtemoc Valentine - discovering your true self after trauma     Mindful Eating/The Hungry Brain  Am I Hungry? Mindful eating virtual  adalberto (www.amihungry.com)  The Hungry Brain by Melissa Albarran, PhD  Mindless Eating by Leonardo Urbano  Weight Loss Surgery Will Not Treat Food Addiction by Jelena Bocanegra Ph.D     Metabolic Dysfunction, Hormones and Cravings  Why We Get Sick? By Johnathon Weinstein (insulin resistance)  Your Body in Balance: The New Science of Food, Hormones, and Health by Dr. Nick Moore  The Complete Guide to fasting by Dr. Ga  Fast Like a Girl by Dr. Lynne Ardon  The Menopause Reset by Dr. Lynne Ardon  Sugar, Salt & Fat by Ludmila Gupta, Ph.D, R.D.  The Truth About Sugar - documentary on sugar (Free on MotionDSP, https://mBeat Mediau.be/8G3udtsLN2a)  Reverse Visceral Fat: #1 Way to Increase Your Lifespan & End Inflammation with Dr. Jm Deshpande on Utube @ https://CosmEthics.be/nupPRnvUpJY?si=yi3vxoDvIWE4NfeR     Nutrition Support  You Are What You Eat - Netfix series on twin study looking at impact of nutrition changes on health  The End of Dieting: How to Live for Life by Dr. Bud Minor M.D. or listen to The Tengrade Podcast Episode 63: Understanding \"Nutritarian\" Eating w/Dr. Bud Minor  The Game Changers- Netflix Documentary on plant based nutrition  The Dr. Clark T5 Wellness Plan by Dr. Jessee Clark MD  The Complete Guide to fasting by Dr. Ga  @[facility] (Instagram Dietician with support surrounding nutrition and meal prep/planning)     Education, Motivation and Support Resources  Live to 100: Secrets of the Blue Zones - Netflix series on the secrets to communities living over 100 years old  Atomic Habits by Erik Urias (a book about taking small steps to promote greater behavior change)   Motivation adalberto (black box with white \")- daily supportive messages sent to your phone  Can't Hurt Me by Jose De Jesus Gandhi (a book exploring the power of  discipline in achieving your goals)  Fed Up - documentary about obesity (Free on Utube)  Www.yourweightmatters.org - Obesity Action Coalition sponsored Blog posts  Obesity Action Coalition Resources on topics specific to weight management (www.obesityaction.org)  Fitlosophy Fitspiration - journal to better health (journal book found at [employer] in fitness aisle)  Fadi Lane talk titled: The Call to Courage (Netflix)  The Exam Room by the Physician's Committee (Podcast)  Nutrition Facts by Dr. Talbot (Podcast)     Balanced Nutrition includes:      Build the mentality of Food 4 Fuel. Clean eating with whole foods and eliminating/reducing ultra processed foods.  Be an intuitive eater and using mindful eating practices.  Eat a balanced plate with protein and produce at all meals: 1/4 plate- protein, 1/2 plate non starchy veggies, and 1/4 plate fruit or complex carbohydrate.  Drink water with all meals and use a salad plate to naturally reduce portions.  Eliminate/reduce late night eating by stopping after 7pm. Allowing your body to fast for 12 hours (drink only water, tea or black coffee without any additives).                What’s for Lunch? Planning and Prepping the Basics  March 4, 2022  Posted in Blog, Nutrition  By Your Weight Matters Campaign     Whether you work from home or in an office, lunch can be a challenge. Finding time mid-day for a nutrition-packed lunch isn’t easy. Remember, lunch is a time to refuel for the second part of the day. Packing food with a lot of nutrition can make your afternoon more successful! Instead of being pulled to the nearest fast food restaurant, look for new options to add to your day.     Lunch Basics  First, start with these basic tips to eat more power-packed lunches.     Make Your Lunch Balanced  Your lunch should include protein, fruit, vegetables, dairy and whole grains. Adding all the food groups can give you maximum nutrition. Ham and cheese on a whole wheat tortilla with  yogurt, apple slices and carrot sticks can be a great meal. It provides protein, complex carbohydrates and fiber. You could also consider a peanut butter and banana sandwich on whole wheat bread with green peppers and low-fat dip, string cheese and strawberries.     Plan Ahead  There is nothing worse than not having a plan for lunch and resorting to a greasy burger with fries. Spend some prep time to get settled for the week by chopping and bagging vegetables, slicing fruit in containers, and portioning out whole grain crackers. If you prep on Sunday, you can grab and go all week. You could also consider packing the entire lunch the night before.     Cook a Little Extra  Having grilled chicken on Sunday night? [place] a couple extra chicken breasts to chop up for a salad or put inside a whole grain tortilla. Think of this often. Last night’s dinner can be tomorrow’s lunch! An extra serving of chili or leftover pasta are other great options.     Ideas to Get Started with the Main Dish     Find a thermos and have different sizes of containers on hand. This is a great way to use your leftovers!     Chicken, pork or steak with a side of barbecue sauce  Soups and chili  Last night’s dinner -stir prasad or casserole (higher caloric density, choose a smaller portion like 1 cup)     [place] or Wrap: Use low carb or skip the bread all together and use a cassius lettuce leaf as your base  Whole grain bread with lunch meat  Whole grain tortilla with peanut butter and banana (or any fruit--try strawberry!)  Cheese quesadilla     Salads to Go:  Lettuce, veggies and protein (use last night’s protein)  Cold pasta salad with grilled chicken  Tuna or chicken salad     Add Sensible Sides:  [place]  Baked chips  Peppers and hummus  Low-fat yogurt  Blueberries  Whole grain crackers  Pasta salad  Dried fruit  Berries and fruit dip  Cheese cubes  Veggies and dip  Avocado slices  Cottage cheese  Last night’s vegetables     What about Lunch  Out?  On a busy day, takeout can be an option. You can still make your health a priority while grabbing takeout or eating out. Check out the menu for nutrition-packed proteins and sides. Choose salads and grilled meat and opt for smaller portions. Many restaurants are adding more and more healthy options as time goes on, so making healthy choices keeps getting easier.     Take it one step at a time on your way to a healthy lunch! Every bite counts.     For practical tips on meal prep, planning, shopping and dining please watch the Utube video presented at the [id number] conference by Obesity Action Coalition with the following link:     https://www.obesityaction.org/oac-videos/planning-shopping-and-dining-practical-tactics-for-good-nutrition/#:~:text=Planning%2C%20Shopping%20and%20Dining%3A%20Practical%20Tactics%20for%20Good%20Nutrition

## 2025-07-14 NOTE — PROGRESS NOTES
The 21st Century Cures Act makes medical notes like these available to patients in the interest of transparency. Please be advised this is a medical document. Medical documents are intended to carry relevant information, facts as evident, and the clinical opinion of the practitioner. The medical note is intended as peer to peer communication and may appear blunt or direct. It is written in medical language and may contain abbreviations or verbiage that are unfamiliar.     Myrtle Barrios is a 19 year old female who is here for   Chief Complaint   Patient presents with    Well Adult       HPI:     This 19-year-old female presents to the office as a new patient to my practice for her annual wellness exam and to establish care.    The patient had been seen at the immediate care 3 weeks ago with complaint of sore throat.  She tested negative for strep and gonorrhea and chlamydia of the throat.  She states she continues to have some sore throat but no fever, chills, cough, runny nose, ear pain.  She had taken some leftover amoxicillin which did not help.  She denies any seasonal allergies.    The patient has had worsening hair loss over the last year.  The hair loss is only from the scalp.  She has not had hair loss to the eyebrows or anywhere else on the body.  She has not noticed any bald spots.  She states it falls out in clumps when she is brushing it.  She has no known history for thyroid problems.  Her mother does have hypothyroidism.  She has not been doing anything for the hair loss.  She denies any other symptoms suggestive of thyroid problems.    She does have history for anxiety and would be interested in a referral for a therapist.  She has never been on medication for her anxiety.  And she states anxiety is not always present.  She does admit to increased stress in her life.    The patient has never been vaccinated for COVID and does not want to receive the COVID-vaccine.  Her last Tdap booster was in 2014  and she agrees to get her booster updated today.    Exercise:  twice per week, cardio and weight.  Diet: doesn't watch and watches somewhat  Sleep: 4-8 hours     Depression/Anxiety:   PHQ-2 SCORE: 0  , done 2025   Feeling tired or having little energy: 1    If you checked off any problems, how difficult have these problems made it for you to do your work, take care of things at home, or get along with other people?: Not difficult at all    Last [place] Suicide Screening on 2025 was No Risk.       Fall Risk: No falls last 3 months  Gun in home:Yes            Gun safe:Yes  Glasses/contacts:No             Recent eye doctor visit:No   Hearing aids:No    Family History for:  Breast ca-No  Ovarian ca-No  Uterine ca-No  Colon ca-No      miscarriage  FDLMP 2025, cycles are irregular, no contraception, did home preg test which was negative, occasionally uses condoms  Last pap smear: 1 year ago  Last Mammogram: n/a  Last Bone Density/Ca intake (postmenopausal): n/a  Colonoscopy: n/a      Past Medical History[1]    Past Surgical History[2]    Family History[3]    Social Hx on file[4]    Works as a CNA    Medications:    Medications Ordered Prior to Encounter[5]    Allergies:    Allergies[6]    REVIEW OF SYSTEMS:     ROS is negative except as stated in HPI.      EXAM:   /76 (BP Location: Left arm, Patient Position: Sitting, Cuff Size: adult)   Pulse 89   Temp 97.5 °F (36.4 °C) (Temporal)   Resp 21   Ht 5' 7\" (1.702 m)   Wt 212 lb (96.2 kg)   LMP 2025 (Approximate)   SpO2 98%   BMI 33.20 kg/m²     Wt Readings from Last 3 Encounters:   25 212 lb (96.2 kg)   25 213 lb (96.6 kg)   24 200 lb (90.7 kg)       BP Readings from Last 3 Encounters:   25 128/76   25 131/75   25 118/60        Visual Acuity  Right Eye Visual Acuity: Uncorrected Right Eye Chart Acuity: 20/15   Left Eye Visual Acuity: Uncorrected Left Eye Chart Acuity: 20/15   Both Eyes Visual  Acuity: Uncorrected Both Eyes Chart Acuity: 20/15   Able To Tolerate Visual Acuity: Yes      General: WH/obese/WD, in NAD, A and O times 3  HEAD: Normocephalic, atraumatic, no bald spots noted on the scalp.  EYES: JOSEPHINE, EOMI, conjunctiva normal, sclera anicteric.  EARS: Tympanic membranes normal, EAC's normal.  NOSE: Turbinates normal, no bleeding noted.  PHARYNX: Injected, no exudates noted, postnasal drip is noted mucous membrane moist, airway patent.  NECK:  No cervical lymphadenopathy or thyromegaly, no bruits noted.  HEART: Regular rate and rhythm, no S3, S4 or murmur noted.  LUNGS: Clear to ausculation. No retractions or tachypnea noted.  BREASTS: Deferred-sees GYN   ABDOMEN: Soft, obese, nontender, no guarding, rigidity or rebound, no masses or hepatosplenomegaly, normal bowel sounds in all four quadrants.  : Deferred-sees GYN  BACK: No tenderness over the thoracic or lumbar spine. No scoliosis noted.  EXTREMITIES: No clubbing, cyanosis, edema noted. Motor strength +5/5 in all 4 extremities. DTR's +2/4 in all 4 extremities. Able to toe and heel walk.  SKIN: Warm and dry.  Multiple tattoos are noted.  NEURO: Cr. N. II-XII intact, normal gait  PSYCH: Normal affect and mood.      ASSESSMENT AND PLAN:     1. Wellness examination  -We discussed the following:  Healthy diet and exercise, cancer screening, self breast exams and calcium and vitamin D supplementation.    2. Laboratory exam ordered as part of routine general medical examination    - CBC With Differential With Platelet  - Comp Metabolic Panel (14)  - Lipid Panel  - TSH W Reflex To Free T4    3. Screening for diabetes mellitus    - HGB A1C [496] [Q]    4. Screening for STD (sexually transmitted disease)    - Chlamydia/Gc Amplification [E]    5. Family history of thyroid disease  6. Hair loss    The patient states her mother's recent laboratory for thyroid disease showed abnormal TPO and she is asking this be checked along with her thyroid test.    -  THYROID PEROXIDASE AND THYROGLOBULIN ANTIBODIES [4660] [Q]    7. Sore throat    I reassured the patient I see no evidence for infection but she does have a lot of postnasal drip which may be irritating her throat.  I told her she may use over-the-counter antihistamines such as Allegra, Zyrtec, Claritin to see if this gives her relief of her sore throat.    8. Anxiety    The patient was informed she would be contacted by a staff member from [employer] who would assist her in getting established with a therapist.    - [facility] Navigator    9. Elevated cholesterol    Last lipid panel done on 4/4/2024 shows a total cholesterol of 208, HDL 59,  and triglycerides 123.  She is due for recheck on her lipid panel.    10. History of kidney stones    Patient states she had lithotripsy at the age of 10 due to kidney stones.  She has not had any recurrence since childhood.  She states she make sure she drinks a lot of water throughout the day.    11. Class 1 obesity due to excess calories without serious comorbidity with body mass index (BMI) of 33.0 to 33.9 in adult    The patient was encouraged to continue with her exercise program and to monitor her diet.  Fitness handout was given today.    12. Need for Tdap vaccination    Tdap booster was given today.  Side effects are reviewed.    - TdaP (Adacel, Boostrix) [31794]         Health Maintenance:    Health Maintenance   Topic Date Due    Annual Physical  07/14/2026    Meningococcal B Vaccine (1 of 2 - Standard) 07/14/2026    Chlamydia Screening  03/27/2024    Pap Smear  10/15/2027    DTaP,Tdap,and Td Vaccines (7 - Td or Tdap) 12/26/2024    Influenza Vaccine (1) 10/01/2025    Annual Depression Screening  Completed    Hepatitis B Vaccines  Completed    MMR Vaccines  Completed    Varicella Vaccines  Completed    Meningococcal Vaccine  Completed    HPV Vaccines  Completed    Pneumococcal Vaccine: Birth to 50yrs  Aged Out    Hepatitis A Vaccines  Aged Out         Orders  This Visit:  Orders Placed This Encounter   Procedures    CBC With Differential With Platelet    Comp Metabolic Panel (14)    Lipid Panel    TSH W Reflex To Free T4    HGB A1C [496] [Q]    THYROID PEROXIDASE AND THYROGLOBULIN ANTIBODIES [7260] [Q]    TdaP (Adacel, Boostrix) [15048]    Chlamydia/Gc Amplification [E]       Meds This Visit:  Requested Prescriptions      No prescriptions requested or ordered in this encounter       Imaging & Referrals:  TETANUS, DIPHTHERIA TOXOIDS AND ACELLULAR PERTUSIS VACCINE (TDAP), >7 YEARS, IM USE   NAVIGATOR       The patient indicates understanding of these issues and agrees to the plan.  The patient is asked to return pending lab results.  Dana Lee DO    Total time: 45 minutes including precharting, H&P, plan of care.    This dictation was performed with a verbal recognition program (DRAGON) and it was checked for errors. It is possible that there are still dictated errors within this office note. If so, please bring any errors to my attention for an addendum. All efforts were made to ensure that this office note is accurate         [1]   Past Medical History:   Kidney stone    Ureterocele   [2]   Past Surgical History:  Procedure Laterality Date    Lithotripsy     [3]   Family History  Problem Relation Age of Onset    Thyroid disease Mother         Hypothyroid    Diabetes Sister         Type 1    Diabetes Maternal Grandmother    [4]   Social History  Socioeconomic History    Marital status: Single   Tobacco Use    Smoking status: Never    Smokeless tobacco: Never   Vaping Use    Vaping status: Never Used   Substance and Sexual Activity    Alcohol use: Yes     Comment: occ    Drug use: Never    Sexual activity: Yes     Partners: Male   Other Topics Concern    Caffeine Concern No    Exercise No    Seat Belt Yes    Special Diet No    Stress Concern No    Weight Concern No   [5]   No current outpatient medications on file prior to visit.     No current  facility-administered medications on file prior to visit.   [6] No Known Allergies

## 2025-07-18 LAB
ABSOLUTE BASOPHILS: 61 CELLS/UL (ref 0–200)
ABSOLUTE EOSINOPHILS: 197 CELLS/UL (ref 15–500)
ABSOLUTE LYMPHOCYTES: 1646 CELLS/UL (ref 850–3900)
ABSOLUTE MONOCYTES: 524 CELLS/UL (ref 200–950)
ABSOLUTE NEUTROPHILS: 4372 CELLS/UL (ref 1500–7800)
ALBUMIN/GLOBULIN RATIO: 1.3 (CALC) (ref 1–2.5)
ALBUMIN: 4.5 G/DL (ref 3.6–5.1)
ALKALINE PHOSPHATASE: 91 U/L (ref 31–125)
ALT: 12 U/L (ref 6–29)
AST: 14 U/L (ref 10–30)
BASOPHILS: 0.9 %
BILIRUBIN, TOTAL: 0.6 MG/DL (ref 0.2–1.2)
BUN: 14 MG/DL (ref 7–25)
CALCIUM: 9.7 MG/DL (ref 8.6–10.2)
CARBON DIOXIDE: 26 MMOL/L (ref 20–32)
CHLAMYDIA TRACHOMATIS$RNA, TMA: NOT DETECTED
CHLORIDE: 102 MMOL/L (ref 98–110)
CHOL/HDLC RATIO: 3.3 (CALC)
CHOLESTEROL, TOTAL: 179 MG/DL
CREATININE: 0.85 MG/DL (ref 0.5–0.96)
EGFR: 100 ML/MIN/1.73M2
EOSINOPHILS: 2.9 %
GLOBULIN: 3.6 G/DL (CALC) (ref 1.9–3.7)
GLUCOSE: 86 MG/DL (ref 65–99)
HDL CHOLESTEROL: 54 MG/DL
HEMATOCRIT: 44.4 % (ref 35–45)
HEMOGLOBIN A1C: 5.1 %
HEMOGLOBIN: 14.1 G/DL (ref 11.7–15.5)
LDL-CHOLESTEROL: 109 MG/DL (CALC)
LYMPHOCYTES: 24.2 %
MCH: 29.2 PG (ref 27–33)
MCHC: 31.8 G/DL (ref 32–36)
MCV: 91.9 FL (ref 80–100)
MONOCYTES: 7.7 %
MPV: 10.7 FL (ref 7.5–12.5)
NEISSERIA GONORRHOEAE$RNA, TMA: NOT DETECTED
NEUTROPHILS: 64.3 %
NON-HDL CHOLESTEROL: 125 MG/DL (CALC)
PLATELET COUNT: 383 THOUSAND/UL (ref 140–400)
POTASSIUM: 4.8 MMOL/L (ref 3.5–5.3)
PROTEIN, TOTAL: 8.1 G/DL (ref 6.1–8.1)
RDW: 12.5 % (ref 11–15)
RED BLOOD CELL COUNT: 4.83 MILLION/UL (ref 3.8–5.1)
SODIUM: 138 MMOL/L (ref 135–146)
THYROGLOBULIN ANTIBODIES: 21 IU/ML
THYROID PEROXIDASE$ANTIBODIES: 193 IU/ML
TRIGLYCERIDES: 74 MG/DL
TSH W/REFLEX TO FT4: 2.25 MIU/L
WHITE BLOOD CELL COUNT: 6.8 THOUSAND/UL (ref 3.8–10.8)

## 2025-07-21 ENCOUNTER — TELEPHONE (OUTPATIENT)
Age: 20
End: 2025-07-21

## 2025-07-21 NOTE — TELEPHONE ENCOUNTER
Hello - I am reaching out from the [facility] Navigation department, following up on an order from your provider's office to assist in connecting you with resources for care. If you would like to discuss this further, please give us a call at [phone], or for more immediate assistance you can contact our 24-hour help line at [phone]. We look forward to hearing from you soon.

## 2025-07-21 NOTE — TELEPHONE ENCOUNTER
Hello,    Sorry I missed you - I am reaching out from the [facility] department, following up on an order from your provider's office to assist in connecting you with resources for care. If you would like to discuss this further, please give us a call back at [phone], or for more immediate assistance you can contact our 24-hour help line at [phone]. We look forward to hearing from you soon.

## 2025-07-23 ENCOUNTER — TELEPHONE (OUTPATIENT)
Age: 20
End: 2025-07-23

## 2025-08-08 ENCOUNTER — TELEPHONE (OUTPATIENT)
Age: 20
End: 2025-08-08

## 2025-08-11 ENCOUNTER — TELEPHONE (OUTPATIENT)
Age: 20
End: 2025-08-11

## (undated) NOTE — ED AVS SNAPSHOT
Fausto Henderson   MRN: [id number]    Department:  [facility] in [place]   Date of Visit:  12/28/2019           Disclosure     Insurance plans vary and the physician(s) referred by the ER may not be covered by your plan.  Please con tell this physician (or your personal doctor if your instructions are to return to your personal doctor) about any new or lasting problems. The primary care or specialist physician will see patients referred from the [facility] Emergency Department.  Kacey Aleman

## (undated) NOTE — LETTER
Date & Time: 5/3/2025, 8:27 AM  Patient: Myrtle Barrios  Encounter Provider(s):    Vannesa Mccurdy APRN       To Whom It May Concern:    Myrtle Barrios was seen and treated in our department on 5/3/2025. She should not return to work until 05/06/2025 .    If you have any questions or concerns, please do not hesitate to call.        Vannesa Mccurdy NP-C  Nurse Practitioner

## (undated) NOTE — ED AVS SNAPSHOT
Lucille Brunner   MRN: [id number]    Department:  THE [facility] in [place]   Date of Visit:  6/16/2018           Disclosure     Insurance plans vary and the physician(s) referred by the ER may not be covered by your plan.  Please cont tell this physician (or your personal doctor if your instructions are to return to your personal doctor) about any new or lasting problems. The primary care or specialist physician will see patients referred from the [facility] Emergency Department.  Galo Juarez